# Patient Record
Sex: FEMALE | Race: WHITE
[De-identification: names, ages, dates, MRNs, and addresses within clinical notes are randomized per-mention and may not be internally consistent; named-entity substitution may affect disease eponyms.]

---

## 2018-05-25 ENCOUNTER — HOSPITAL ENCOUNTER (OUTPATIENT)
Dept: HOSPITAL 96 - M.MRI | Age: 78
End: 2018-05-25
Attending: INTERNAL MEDICINE
Payer: MEDICARE

## 2018-05-25 DIAGNOSIS — M47.897: ICD-10-CM

## 2018-05-25 DIAGNOSIS — N28.1: ICD-10-CM

## 2018-05-25 DIAGNOSIS — G89.29: Primary | ICD-10-CM

## 2018-05-25 DIAGNOSIS — M48.062: ICD-10-CM

## 2018-05-25 DIAGNOSIS — M41.86: ICD-10-CM

## 2018-05-25 DIAGNOSIS — M54.42: ICD-10-CM

## 2018-05-25 DIAGNOSIS — M54.41: ICD-10-CM

## 2018-06-05 ENCOUNTER — HOSPITAL ENCOUNTER (OUTPATIENT)
Dept: HOSPITAL 96 - M.RAD | Age: 78
End: 2018-06-05
Attending: INTERNAL MEDICINE
Payer: MEDICARE

## 2018-06-05 DIAGNOSIS — Z12.31: Primary | ICD-10-CM

## 2020-10-12 ENCOUNTER — HOSPITAL ENCOUNTER (EMERGENCY)
Dept: HOSPITAL 96 - M.ERS | Age: 80
Discharge: HOME | End: 2020-10-12
Payer: MEDICARE

## 2020-10-12 VITALS — SYSTOLIC BLOOD PRESSURE: 164 MMHG | DIASTOLIC BLOOD PRESSURE: 83 MMHG

## 2020-10-12 VITALS — BODY MASS INDEX: 29.23 KG/M2 | HEIGHT: 59 IN | WEIGHT: 145 LBS

## 2020-10-12 DIAGNOSIS — R06.02: Primary | ICD-10-CM

## 2020-10-12 DIAGNOSIS — M79.601: ICD-10-CM

## 2020-10-12 DIAGNOSIS — Z20.828: ICD-10-CM

## 2020-10-12 DIAGNOSIS — Z79.82: ICD-10-CM

## 2020-10-12 DIAGNOSIS — I10: ICD-10-CM

## 2020-10-12 DIAGNOSIS — Z79.899: ICD-10-CM

## 2020-10-12 DIAGNOSIS — E78.00: ICD-10-CM

## 2020-10-12 LAB
ABSOLUTE BASOPHILS: 0.1 THOU/UL (ref 0–0.2)
ABSOLUTE EOSINOPHILS: 0.1 THOU/UL (ref 0–0.7)
ABSOLUTE MONOCYTES: 0.7 THOU/UL (ref 0–1.2)
ALBUMIN SERPL-MCNC: 3.9 G/DL (ref 3.4–5)
ALP SERPL-CCNC: 69 U/L (ref 46–116)
ALT SERPL-CCNC: 26 U/L (ref 30–65)
ANION GAP SERPL CALC-SCNC: 6 MMOL/L (ref 7–16)
APTT BLD: 24.1 SECONDS (ref 25–31.3)
AST SERPL-CCNC: 16 U/L (ref 15–37)
BACTERIA-REFLEX: (no result) /HPF
BASOPHILS NFR BLD AUTO: 0.9 %
BE: 1.2 MMOL/L
BILIRUB SERPL-MCNC: 0.4 MG/DL
BILIRUB UR-MCNC: NEGATIVE MG/DL
BUN SERPL-MCNC: 21 MG/DL (ref 7–18)
CALCIUM SERPL-MCNC: 9.1 MG/DL (ref 8.5–10.1)
CHLORIDE SERPL-SCNC: 105 MMOL/L (ref 98–107)
CO2 SERPL-SCNC: 29 MMOL/L (ref 21–32)
COLOR UR: YELLOW
CREAT SERPL-MCNC: 1.2 MG/DL (ref 0.6–1.3)
EOSINOPHIL NFR BLD: 1.5 %
GLUCOSE SERPL-MCNC: 87 MG/DL (ref 70–99)
GRANULOCYTES NFR BLD MANUAL: 64.5 %
HCT VFR BLD CALC: 41.6 % (ref 37–47)
HGB BLD-MCNC: 13.9 GM/DL (ref 12–15)
INR PPP: 1
KETONES UR STRIP-MCNC: NEGATIVE MG/DL
LYMPHOCYTES # BLD: 1.5 THOU/UL (ref 0.8–5.3)
LYMPHOCYTES NFR BLD AUTO: 22.9 %
MCH RBC QN AUTO: 31 PG (ref 26–34)
MCHC RBC AUTO-ENTMCNC: 33.3 G/DL (ref 28–37)
MCV RBC: 93.1 FL (ref 80–100)
MONOCYTES NFR BLD: 10.2 %
MPV: 7.7 FL. (ref 7.2–11.1)
NEUTROPHILS # BLD: 4.2 THOU/UL (ref 1.6–8.1)
NUCLEATED RBCS: 0 /100WBC
PCO2 BLD: 39.6 MMHG (ref 35–45)
PLATELET COUNT*: 277 THOU/UL (ref 150–400)
PO2 BLD: 76.8 MMHG (ref 75–100)
POTASSIUM SERPL-SCNC: 3.8 MMOL/L (ref 3.5–5.1)
PROT SERPL-MCNC: 7.7 G/DL (ref 6.4–8.2)
PROT UR QL STRIP: NEGATIVE
PROTHROMBIN TIME: 10.1 SECONDS (ref 9.2–11.5)
RBC # BLD AUTO: 4.47 MIL/UL (ref 4.2–5)
RBC # UR STRIP: (no result) /UL
RBC #/AREA URNS HPF: (no result) /HPF (ref 0–2)
RDW-CV: 14.3 % (ref 10.5–14.5)
SODIUM SERPL-SCNC: 140 MMOL/L (ref 136–145)
SP GR UR STRIP: >= 1.03 (ref 1–1.03)
SQUAMOUS: (no result) /LPF (ref 0–3)
URINE CLARITY: CLEAR
URINE GLUCOSE-RANDOM: NEGATIVE
URINE LEUKOCYTES-REFLEX: (no result)
URINE NITRITE-REFLEX: NEGATIVE
URINE WBC-REFLEX: (no result) /HPF (ref 0–5)
UROBILINOGEN UR STRIP-ACNC: 0.2 E.U./DL (ref 0.2–1)
WBC # BLD AUTO: 6.4 THOU/UL (ref 4–11)

## 2020-10-13 NOTE — EKG
[place] [address]
Phone:  [phone]                     ELECTROCARDIOGRAM REPORT      
_______________________________________________________________________________
 
Name:         CARLINE BRAR         Room:                     [facility]#:    [id number]     Account #:     [id number]  
Admission:    10/12/20    Attend Phys:                     
Discharge:    10/12/20    Date of Birth: [birth year]  
Date of Service: 10/12/20 1556  Report #:      [id number]
        [id number]
_______________________________________________________________________________
THIS REPORT FOR:  //name//                      
 
                         [facility] ED
                                       
Test Date:    2020-10-12               Test Time:    15:56:38
Pat Name:     CARLINE BRAR         Department:   
Patient ID:   [id number]            Room:          
Gender:       F                        Technician:   [facility]
:          1940               Requested By: Adilene Luther
Order Number: [id number] MD:   Luis Lindquist
                                 Measurements
Intervals                              Axis          
Rate:         93                       P:            46
MI:           196                      QRS:          -48
QRSD:         102                      T:            45
QT:           374                                    
QTc:          466                                    
                           Interpretive Statements
Sinus rhythm
Left anterior fascicular block
Abnormal R-wave progression, early transition
Left ventricular hypertrophy
Compared to ECG 2013 08:38:55
Left anterior fascicular block persists
Left ventricular hypertrophy now present
Electronically Signed On 10- 15:47:15 CDT by Luis Lindquist
[email or web address]
 
 
 
 
 
 
 
 
 
 
 
 
 
 
 
 
 
  <ELECTRONICALLY SIGNED>
                                           By: Luis Lindquist MD, FACC     
  10/13/20     1547
D: 10/12/20 1556   _____________________________________
T: 10/12/20 1556   Luis Lindquist MD, FACC       /EPI

## 2020-11-02 ENCOUNTER — HOSPITAL ENCOUNTER (OUTPATIENT)
Dept: HOSPITAL 96 - M.NUC | Age: 80
End: 2020-11-02
Attending: INTERNAL MEDICINE
Payer: MEDICARE

## 2020-11-02 DIAGNOSIS — N18.30: ICD-10-CM

## 2020-11-02 DIAGNOSIS — I12.9: ICD-10-CM

## 2020-11-02 DIAGNOSIS — I07.1: Primary | ICD-10-CM

## 2020-11-02 NOTE — 2DMMODE
[place] [address]
Phone:  [phone] 2 D/M-MODE ECHOCARDIOGRAM     
_______________________________________________________________________________
 
Name:         CARLINE BRAR         Room:                     REG CLI
M.R.#:    [id number]     Account #:     [id number]  
Admission:    20    Attend Phys:   Theo Mims,
Discharge:                Date of Birth: [birth year]  
Date of Service: 20 1246  Report #:      [id number]
        [id number]
_______________________________________________________________________________
THIS REPORT FOR:
 
cc:  Mauri Sanchez MD, Dean L. MD Blick,Alfred JONAS MD [facility]        
                                                                       ~
 
--------------- APPROVED REPORT --------------
 
 
Study performed:  2020 10:55:29
 
EXAM: Comprehensive 2D, Doppler, and color-flow 
Echocardiogram 
Patient Location: Out-Patient   
 
      BSA:         1.63
HR: 96 bpm BP:          191/89 mmHg 
 
Other Information 
Study Quality: Fair
 
Indications
Chest Pain
 
2D Dimensions
IVSd:  11.15 (7-11mm) LVOT Diam:  19.78 (18-24mm) 
LVDd:  39.18 mm  
PWd:  9.20 (7-11mm) Ascending Ao:  24.75 (22-36mm)
LVDs:  28.46 (25-40mm) 
Aortic Root:  27.47 mm 
 
Volumes
Left Atrial Volume (Systole) 
    LA ESV Index:  14.80 mL/m2
 
Aortic Valve
AoV Peak Hermes.:  1.08 m/s 
AO Peak Gr.:  4.63 mmHg  LVOT Max PG:  3.78 mmHg
AO Mean Gr.:  2.80 mmHg  LVOT Mean P.94 mmHg
    LVOT Max V:  0.97 m/s
AO V2 VTI:  28.15 cm  LVOT Mean V:  0.64 m/s
SANDRO (VTI):  2.54 cm2  LVOT V1 VTI:  23.28 cm
 
Mitral Valve
    E/A Ratio:  0.48
 
 
[place] [address]
Phone:  [phone]                     2 D/M-MODE ECHOCARDIOGRAM     
_______________________________________________________________________________
 
Name:         CARLINE BRAR         Room:                     REG CLI
M.R.#:    [id number]     Account #:     [id number]  
Admission:    20    Attend Phys:   Theo Mims,
Discharge:                Date of Birth: [birth year]  
Date of Service: 20 1246  Report #:      [id number]
        [id number]
_______________________________________________________________________________
    MV Decel. Time:  219.63 ms
MV E Max Hermes.:  0.47 m/s 
MV PHT:  63.69 ms  
MVA (PHT):  3.45 cm2  
 
TDI
E/Lateral E':  5.22 E/Medial E':  5.88
   Medial E' Hermes.:  0.08 m/s
   Lateral E' Hermes.:  0.09 m/s
 
Pulmonary Valve
PV Peak Hermes.:  0.95 m/s PV Peak Gr.:  3.61 mmHg
 
Tricuspid Valve
    RAP Estimate:  5.00 mmHg
TR Peak Gr.:  20.90 mmHg RVSP:  25.90 mmHg
    PA Pressure:  25.90 mmHg
 
Left Ventricle
The left ventricle is normal size. There is normal LV segmental wall 
motion. There is normal left ventricular wall thickness. Left 
ventricular systolic function is normal. The left ventricular 
ejection fraction is within the normal range. LVEF is 55-60%. Grade I 
- abnormal relaxation pattern.
 
Right Ventricle
The right ventricle is normal size. The right ventricular systolic 
function is normal.
 
Atria
The left atrium size is normal. The right atrium size is 
normal.
 
Aortic Valve
The aortic valve is normal in structure. trace aortic regurgitation 
is present. There is no aortic valvular stenosis.
 
Mitral Valve
The mitral valve is normal in structure. There is no mitral valve 
regurgitation noted. No evidence of mitral valve stenosis.
 
Tricuspid Valve
The tricuspid valve is normal in structure. Mild tricuspid 
regurgitation.
 
Pulmonic Valve
 
 
[place] [address]
Phone:  [phone]                     2 D/M-MODE ECHOCARDIOGRAM     
_______________________________________________________________________________
 
Name:         CARLINE BRAR         Room:                     REG CLI
M.R.#:    [id number]     Account #:     [id number]  
Admission:    20    Attend Phys:   Theo Mims,
Discharge:                Date of Birth: [birth year]  
Date of Service: 20 1246  Report #:      [id number]
        [id number]
_______________________________________________________________________________
Pulmonic valve is not well visualized. There is no pulmonic valvular 
regurgitation.
 
Great Vessels
The aortic root is normal in size. IVC is normal in size and 
collapses >50% with inspiration.
 
Pericardium
There is no pericardial effusion.
 
<Conclusion>
Left ventricular systolic function is normal.
The left ventricular ejection fraction is within the normal range.
 
 
 
 
 
 
 
 
 
 
 
 
 
 
 
 
 
 
 
 
 
 
 
 
 
 
 
 
 
 
 
  <ELECTRONICALLY SIGNED>
                                           By: Alfred De La Rosa MD, FACC      
  20     1246
D: 20 1246   _____________________________________
T: 20 1246   Alfred De La Rosa MD, FACC        /INF

## 2020-11-03 NOTE — CARDNUC
[place] [address]
Phone:  [phone]                     CARDIAC NUCLEAR IMAGING REPORT
_______________________________________________________________________________
 
Name:         CARLINE BRAR         Room:                     REG CLI
M.R.#:    [id number]     Account #:     [id number]  
Admission:    11/02/20    Attend Phys:   Theo Mims,
Discharge:                Date of Birth: [birth year]  
Date of Service: 11/03/20 1213  Report #:      [id number]
        [id number] 
_______________________________________________________________________________
THIS REPORT FOR:
 
cc:  Mauri Sanchez MD, Dean L. MD Liston,Theo RAVI MD [facility]     
                                                                       ~
 
--------------- APPROVED REPORT --------------
 
 
Study performed:  11/02/2020 09:00:00
 
Indication: Chest pain radiate to right arm, dyspnea, tachycardia, 
LAFB.
Patient Location: Out-Patient
Stress Tech: Darcy Pena
Stress Nurse: Kasia Mclean RN
 
Ht: 4 ft 11 in  Wt: 150 lbs  BSA:  1.63 m2
    BMI:  30.29
 
Medical History
Medical History: Angina, Dyspnea, Tachycardia, LAFB, HTN, 
HLD.
Medications: ASA 81 Mg, Atorvastatin, Benazepril, Catapress, 
HCTZ.
Allergies: Iodine.
Cardiac Risk Factors: Age, HTN, Hyperlipidemia, SOB, Tachycardia, 
LAFB.
Previous Cardiac Procedures: None
Pretest Chest Pain Characteristics: No chest pain
Exercise History: Indeterminate
Physical Disabilities: Unstable/unsteady gait.
Meds Held (24 hrs): None
 
Resting Data
Rest SPECT myocardial perfusion imaging was performed in supine 
position 30 minutes following the intravenous injection of 9.2 mCi of 
Tc-99m Sestamibi.
Time of rest injection: 09:20     
The images were gated to evaluate regional wall motion and calculate 
left ventricular ejection fraction. 
Administration Route: IV
Administration Site: Right Hand
 
Pharmacologic Stress
 
 
[place] [address]
Phone:  [phone]                     CARDIAC NUCLEAR IMAGING REPORT
_______________________________________________________________________________
 
Name:         CARLINE BRAR         Room:                     REG CLI
M.R.#:    [id number]     Account #:     [id number]  
Admission:    11/02/20    Attend Phys:   Theo Mims,
Discharge:                Date of Birth: [birth year]  
Date of Service: 11/03/20 1213  Report #:      [id number]
        [id number] 
_______________________________________________________________________________
Pharmacologic stress test was performed by injecting Regadenoson 0.4 
mg IV push over 10-15 seconds immediately followed by the intravenous 
injection of 30.4 mCi of Tc-99m Sestamibi.
Time of stress injection: 11:00     
Administration Route: IV
Administration Site: Right Hand
Heart Rate at time of stress injection: 138 bpm.
Gated Stress SPECT was performed 45 minutes after stress 
injection.
The images were gated to evaluate regional wall motion and calculate 
left ventricular ejection fraction. 
Prone imaging was performed.
 
Stress Test Details
Stress Test:  Pharmacologic stress was paired with low level 
exercise.      
  Reason for pharmacologic stress test: Unstable/unsteady 
gait..      
 
HR      Max Heart Rate (APMHR): 138 bpm  
Resting HR:            96 bpm   Target HR (85% APMHR): 117 bpm  
Max HR Achieved:  146 bpm        
% of APMHR:         105         
Recovery HR:            109 bpm        
 
BP           
Resting BP:  191/89 mmHg        
Max BP:       200/81 mmHg        
Recovery BP:       178/90 mmHg        
ECG           
Resting ECG:  Sinus Rhythm        
Stress ECG:     Sinus Tachycardia       
ST Change: None          
Arrhythmia:    None         
Recovery ECG: Sinus Rhythm        
Recovery ST Change: None        
Recovery Arrhythmia: None        
 
Clinical
Reason for Termination: Completed protocol
Stress Symptoms: Dyspnea, Dizziness.
Exercise duration: 4 min 00 sec
Exercise capacity: 2.30 METs
The patient tolerated walking Lexiscan protocol without significant 
cardiac symptoms.
 
 
 
[place] [address]
Phone:  [phone]                     CARDIAC NUCLEAR IMAGING REPORT
_______________________________________________________________________________
 
Name:         CARLINE BRAR         Room:                     REG CLI
M.R.#:    [id number]     Account #:     [id number]  
Admission:    11/02/20    Attend Phys:   Theo Mims,
Discharge:                Date of Birth: [birth year]  
Date of Service: 11/03/20 1213  Report #:      [id number]
        [id number] 
_______________________________________________________________________________
Nurse Comments
An 80 year old female presented for a walking Lexiscan r/t radiating 
chest pain, dyspnea, tachycardia, LAFB. Test well tolerated. Recovery 
unremarkable. Patient was stable with some continued tachycardia, 
asymptomatic. Patient was escorted to Echo then to Nuclear Medicine 
for imaging. Patient stated she felt good at that time.
 
Stress ECG Conclusion
The baseline twelve-lead EKG shows sinus rhythm without significant 
ST segment or T wave abnormality.  EKGs obtained during and post 
walking Lexiscan protocol show sinus rhythm and sinus tachycardia 
without any significant ST segment changes when compared to baseline. 
 There were no stress-induced arrhythmias.
 
Study Quality
Study: Good
Artifact: No artifact 
 
Study Data
At rest, the left ventricular ejection fraction was 72%..   
Post stress, the left ventricular ejection was 73%..   
TID = 1.03.       
 
Perfusion
Perfusion images obtained at rest and post walking Lexiscan protocol 
show uniform uptake of the radioisotope throughout the myocardium.  
There were no defects to suggest infarct or ischemia.
 
Wall Motion
Normal left ventricular wall motion.
 
Nuclear Conclusion
ECG Findings: negative for ischemia 
Clinical Findings: negative for ischemia 
Nuclear Findings: negative for ischemia 
Exercise Capacity: not assessed
Left Ventricular Function: normal 
Risk Study: low
Perfusion images show no defect to suggest infarct or ischemia.  Left 
ventricular systolic function is normal on gated studies.  This is a 
low risk study. 
 
<Conclusion>
The baseline twelve-lead EKG shows sinus rhythm without significant 
ST segment or T wave abnormality.  EKGs obtained during and post 
walking Lexiscan protocol show sinus rhythm and sinus tachycardia 
 
 
[place] [address]
Phone:  [phone]                     CARDIAC NUCLEAR IMAGING REPORT
_______________________________________________________________________________
 
Name:         CARLINE BRAR ELOY         Room:                     REG CLI
M.R.#:    [id number]     Account #:     [id number]  
Admission:    11/02/20    Attend Phys:   Theo Mims,
Discharge:                Date of Birth: [birth year]  
Date of Service: 11/03/20 1213  Report #:      [id number]
        [id number] 
_______________________________________________________________________________
without any significant ST segment changes when compared to baseline. 
 There were no stress-induced arrhythmias.
 
 
 
 
 
 
 
 
 
 
 
 
 
 
 
 
 
 
 
 
 
 
 
 
 
 
 
 
 
 
 
 
 
 
 
 
 
 
 
 
 
 
  <ELECTRONICALLY SIGNED>
                                           By: Theo Mims MD, FACC   
  11/03/20     1213
D: 11/03/20 1213   _____________________________________
T: 11/03/20 1213   Theo Mims MD, FACC     /INF

## 2020-11-18 ENCOUNTER — HOSPITAL ENCOUNTER (OUTPATIENT)
Dept: HOSPITAL 96 - M.RAD | Age: 80
End: 2020-11-18
Attending: NURSE PRACTITIONER
Payer: MEDICARE

## 2020-11-18 DIAGNOSIS — Z12.31: Primary | ICD-10-CM

## 2020-11-18 DIAGNOSIS — I10: ICD-10-CM

## 2022-01-12 ENCOUNTER — HOSPITAL ENCOUNTER (OUTPATIENT)
Dept: HOSPITAL 96 - M.RAD | Age: 82
End: 2022-01-12
Attending: NURSE PRACTITIONER
Payer: MEDICARE

## 2022-01-12 DIAGNOSIS — Z12.31: Primary | ICD-10-CM
